# Patient Record
Sex: FEMALE | Race: WHITE | NOT HISPANIC OR LATINO | Employment: FULL TIME | URBAN - METROPOLITAN AREA
[De-identification: names, ages, dates, MRNs, and addresses within clinical notes are randomized per-mention and may not be internally consistent; named-entity substitution may affect disease eponyms.]

---

## 2020-11-30 ENCOUNTER — TELEPHONE (OUTPATIENT)
Dept: OBSTETRICS AND GYNECOLOGY | Facility: CLINIC | Age: 50
End: 2020-11-30

## 2020-11-30 NOTE — TELEPHONE ENCOUNTER
Spoke with pt appt scheduled     ----- Message from Willem Lozano sent at 11/30/2020  8:57 AM CST -----  Regarding: Appointment  Contact: MALIK MARIE [[id number]]  Name of Who is Calling: MALIK MARIE [[id number]]      What is the request in detail: would like to speak with staff in regards to an appointment for an ER f/u for cyst. Patient would like an appointment before 12/16. Please advise      Can the clinic reply by [facility]: no      What Number to Call Back [facility]: [phone]

## 2020-12-04 ENCOUNTER — OFFICE VISIT (OUTPATIENT)
Dept: OBSTETRICS AND GYNECOLOGY | Facility: CLINIC | Age: 50
End: 2020-12-04
Payer: COMMERCIAL

## 2020-12-04 ENCOUNTER — HOSPITAL ENCOUNTER (OUTPATIENT)
Dept: RADIOLOGY | Facility: HOSPITAL | Age: 50
Discharge: HOME | End: 2020-12-04
Attending: OBSTETRICS & GYNECOLOGY
Payer: COMMERCIAL

## 2020-12-04 VITALS
SYSTOLIC BLOOD PRESSURE: 110 MMHG | WEIGHT: 127.88 LBS | HEIGHT: 66 IN | BODY MASS INDEX: 20.55 KG/M2 | DIASTOLIC BLOOD PRESSURE: 60 MMHG

## 2020-12-04 DIAGNOSIS — Z01.419 ENCOUNTER FOR GYNECOLOGICAL EXAMINATION WITHOUT ABNORMAL FINDING: Primary | ICD-10-CM

## 2020-12-04 DIAGNOSIS — N83.201 BILATERAL OVARIAN CYSTS: ICD-10-CM

## 2020-12-04 DIAGNOSIS — N83.202 BILATERAL OVARIAN CYSTS: ICD-10-CM

## 2020-12-04 DIAGNOSIS — R10.2 PELVIC PAIN: ICD-10-CM

## 2020-12-04 DIAGNOSIS — Z12.39 SCREENING BREAST EXAMINATION: ICD-10-CM

## 2020-12-04 PROCEDURE — 3008F PR BODY MASS INDEX (BMI) DOCUMENTED: ICD-10-PCS | Mod: CPTII,S$GLB,, | Performed by: OBSTETRICS & GYNECOLOGY

## 2020-12-04 PROCEDURE — 1125F PR PAIN SEVERITY QUANTIFIED, PAIN PRESENT: ICD-10-PCS | Mod: S$GLB,,, | Performed by: OBSTETRICS & GYNECOLOGY

## 2020-12-04 PROCEDURE — 99999 PR PBB SHADOW E&M-EST. PATIENT-LVL III: CPT | Mod: PBBFAC,,, | Performed by: OBSTETRICS & GYNECOLOGY

## 2020-12-04 PROCEDURE — 76830 TRANSVAGINAL US NON-OB: CPT | Mod: TC

## 2020-12-04 PROCEDURE — 99999 PR PBB SHADOW E&M-EST. PATIENT-LVL III: ICD-10-PCS | Mod: PBBFAC,,, | Performed by: OBSTETRICS & GYNECOLOGY

## 2020-12-04 PROCEDURE — 76856 US EXAM PELVIC COMPLETE: CPT | Mod: 26,,, | Performed by: RADIOLOGY

## 2020-12-04 PROCEDURE — 76830 TRANSVAGINAL US NON-OB: CPT | Mod: 26,,, | Performed by: RADIOLOGY

## 2020-12-04 PROCEDURE — 99386 PR PREVENTIVE VISIT,NEW,40-64: ICD-10-PCS | Mod: S$GLB,,, | Performed by: OBSTETRICS & GYNECOLOGY

## 2020-12-04 PROCEDURE — 88175 CYTOPATH C/V AUTO FLUID REDO: CPT

## 2020-12-04 PROCEDURE — 87624 HPV HI-RISK TYP POOLED RSLT: CPT

## 2020-12-04 PROCEDURE — 1125F AMNT PAIN NOTED PAIN PRSNT: CPT | Mod: S$GLB,,, | Performed by: OBSTETRICS & GYNECOLOGY

## 2020-12-04 PROCEDURE — 76830 US PELVIS COMP WITH TRANSVAG NON-OB (XPD): ICD-10-PCS | Mod: 26,,, | Performed by: RADIOLOGY

## 2020-12-04 PROCEDURE — 99386 PREV VISIT NEW AGE 40-64: CPT | Mod: S$GLB,,, | Performed by: OBSTETRICS & GYNECOLOGY

## 2020-12-04 PROCEDURE — 76856 US PELVIS COMP WITH TRANSVAG NON-OB (XPD): ICD-10-PCS | Mod: 26,,, | Performed by: RADIOLOGY

## 2020-12-04 PROCEDURE — 3008F BODY MASS INDEX DOCD: CPT | Mod: CPTII,S$GLB,, | Performed by: OBSTETRICS & GYNECOLOGY

## 2020-12-04 RX ORDER — IBUPROFEN 800 MG/1
800 TABLET ORAL EVERY 8 HOURS PRN
Qty: 60 TABLET | Refills: 2 | Status: ON HOLD | OUTPATIENT
Start: 2020-12-04 | End: 2021-02-25 | Stop reason: HOSPADM

## 2020-12-07 DIAGNOSIS — N83.202 BILATERAL OVARIAN CYSTS: Primary | ICD-10-CM

## 2020-12-07 DIAGNOSIS — N83.201 BILATERAL OVARIAN CYSTS: Primary | ICD-10-CM

## 2020-12-08 ENCOUNTER — TELEPHONE (OUTPATIENT)
Dept: UROLOGY | Facility: CLINIC | Age: 50
End: 2020-12-08

## 2020-12-08 ENCOUNTER — TELEPHONE (OUTPATIENT)
Dept: OBSTETRICS AND GYNECOLOGY | Facility: CLINIC | Age: 50
End: 2020-12-08

## 2020-12-08 NOTE — TELEPHONE ENCOUNTER
Pt inquiring if insurance will cover blood work. Will send message to Bry Giraldo and return call to patient.    ----- Message from Lucita Arellano MA sent at 12/8/2020  1:02 PM CST -----    ----- Message -----  From: Malgorzata Rasheed  Sent: 12/7/2020  12:44 PM CST  To: Rudy Moses Staff    Type: Patient Call Back    Who called:self    What is the request in detail: patient stated she would like to speak with Johanne KELLEY. Please call    Can the clinic reply by [facility]? no    Would the patient rather a call back or a response via [facility]? call    Best call back number: [phone] or [phone]

## 2020-12-08 NOTE — TELEPHONE ENCOUNTER
----- Message from Malgorzata Rasheed sent at 12/8/2020  9:45 AM CST -----  Type: Patient Call Back    Who called: self    What is the request in detail: patient would like to speak with Johanne concerning blood work. please call    Can the clinic reply by [facility]? no    Would the patient rather a call back or a response via [facility]? call    Best call back number:.[phone] (home) [phone] (work)

## 2020-12-09 ENCOUNTER — TELEPHONE (OUTPATIENT)
Dept: GYNECOLOGIC ONCOLOGY | Facility: CLINIC | Age: 50
End: 2020-12-09

## 2020-12-09 ENCOUNTER — TELEPHONE (OUTPATIENT)
Dept: OBSTETRICS AND GYNECOLOGY | Facility: CLINIC | Age: 50
End: 2020-12-09

## 2020-12-09 NOTE — TELEPHONE ENCOUNTER
----- Message from Naseem Cyr RN sent at 12/9/2020  1:41 PM CST -----  This is a referral to the department so whoever is next.  J'me  ----- Message -----  From: Yvonne Julien  Sent: 12/9/2020   1:29 PM CST  To: Dave Sommer Staff    Who Called: MALIK MARIE    What is the reqeust in detail: Patient is following up on needing to make an appointment for bilateral ovarian cysts, states she has a referral from Dr. Aurelia Grant and she had labs done at [place]. Please advise.     Can the clinic reply by [facility]?: Yes    Best Call Back Number: [phone]

## 2020-12-09 NOTE — TELEPHONE ENCOUNTER
----- Message from Jaylan Arzate sent at 12/9/2020  9:19 AM CST -----  Regarding: self  Type: Patient Call Back    Who called: Self    What is the request in detail: please be sure the lab orders has been [facility] [address] By No[facility] today    Can the clinic reply by [facility]? no    Would the patient rather a call back or a response via [facility]? Call     Best call back number: [phone]        Patient is aware that her lab orders have been faxed

## 2020-12-09 NOTE — TELEPHONE ENCOUNTER
Spoke with our patient about her schedule appointment in gyn oncology she agreed she voiced understanding of the date, time and location. All questions answered appointment mail. MA/MIRNA /Preceptor Lucas FRAGOSO

## 2020-12-09 NOTE — TELEPHONE ENCOUNTER
----- Message from Temitope Amado sent at 12/8/2020  1:54 PM CST -----  Regarding: Self/  [phone](cell)  or [phone](work)  Type: Patient Call Back    Who called:  Patient    What is the request in detail:  Pt stated her insurance will not cover blood work at hospital.  So she would like orders sent to [email or web address].  Pt would like staff to give her a call once orders have been sent.  Thank you      Would the patient rather a call back or a response via [facility]?   Call back    Best call back number:  [phone] (home) [phone] (work)      Thank you    Patient is aware that her lab orders have been fax per her request        Patient is aware that her

## 2020-12-10 ENCOUNTER — PATIENT MESSAGE (OUTPATIENT)
Dept: OBSTETRICS AND GYNECOLOGY | Facility: CLINIC | Age: 50
End: 2020-12-10

## 2020-12-11 ENCOUNTER — PATIENT MESSAGE (OUTPATIENT)
Dept: OBSTETRICS AND GYNECOLOGY | Facility: CLINIC | Age: 50
End: 2020-12-11

## 2020-12-11 LAB
AFP-TM SERPL-MCNC: 3.2 NG/ML
B-HCG SERPL-ACNC: <3 MIU/ML
CANCER AG125 SERPL-ACNC: 19 U/ML
CANCER AG19-9 SERPL-ACNC: 7 U/ML
CEA SERPL-MCNC: 0.9 NG/ML
ESTRADIOL SERPL-MCNC: 16 PG/ML
INHIBIN B SERPL-MCNC: <10 PG/ML
LDH SERPL P TO L-CCNC: 120 U/L (ref 120–250)
LDH1 CFR SERPL ELPH: 27 % (ref 19–38)
LDH2 CFR SERPL ELPH: 32 % (ref 30–43)
LDH3 CFR SERPL ELPH: 20 % (ref 16–26)
LDH4 CFR SERPL ELPH: 10 % (ref 3–12)
LDH5 CFR SERPL ELPH: 11 % (ref 3–14)

## 2020-12-11 NOTE — PROGRESS NOTES
Subjective:      Patient ID: Yareli Melendez is a 50 y.o. female.    Chief Complaint: Ovarian Cyst Bilateral (referred by Dr. Grant)      HPI    50 yr old para 0 referred from Dr. Grant for a large adnexal mass. Presented to ED at [facility] on 11/27/2020 for flank and abdominal pain. Concern for kidney stones due to her history. CT scan showed multiple retained stones in both kidneys, non obstructing. Also findings of ovarian dermoid cyst.     CT A/P   Multiple small calyceal stones in the kidneys.   There is either a single mass or two adjacent masses in the pelvis appear to be adnexal in origin. This may represent bilateral adnexal masses which abuts in the midline. Variable attenuation with some areas of increased attenuation and at least one focus of fat attenuation. Findings likely represent bilateral dermoid cysts. Combined measures 12.3 x 9.7 x 8.2 cm. Mild free fluid in the posterior CDS.    She reported years since her last GYN exam and subsequently established with Dr. Grant.    Pelvic US 12/4/2020  Uterus 4.8 x 4.8 x 2.9 cm, no discrete masses, ES 1.8 mm  R ov with no normal ovarian tissue identified. 12.2 x 11.5 x 9 cm multi cystic lesion with thick septations and some solid nodular components within some of the septations.  L ov not well-visualized. In left adnexa a fairly heterogeneous echogenic structure 3.5 x 3.5 x 3.1 cm   No FF  Additional history provided by patient is that she underwent a CT scan at an outside facility demonstrating bilateral ovarian dermoids. Left adnexa does have sonographic features c/w fat and dermoid.  The complex cystic lesion does not demonstrate definitive sonographic characteristics of a dermoid. A lung, cystic neoplasm such as a cystadenoma or cystadenocarcinoma would be most likely.    Tumor markers all WNL   is 19  CA 19-9 is 7  Estradiol is 16  HCG < 3  CEA 0.9  AFP 3.2  Inhibin B < 10      LMP ablation.     Last pap smear 12/4/2020 pending    No  medical co morbidities. History for kidney stones.    Prior hysteroscopy/D&C/ablation and cystoscopy/lithotripsy    Family history for father - brain cancer. No breast/GYN/colon cancers.     Review of Systems   Constitutional: Negative for appetite change, chills, diaphoresis, fatigue, fever and unexpected weight change.   Respiratory: Negative for cough, chest tightness, shortness of breath and wheezing.    Cardiovascular: Negative for chest pain, palpitations and leg swelling.   Gastrointestinal: Positive for diarrhea (occas, stress-related). Negative for abdominal distention, abdominal pain, blood in stool, constipation, nausea and vomiting.   Genitourinary: Positive for pelvic pain (resolved). Negative for difficulty urinating, dyspareunia (not sexually active), dysuria, flank pain, frequency, hematuria, menstrual problem, vaginal bleeding, vaginal discharge and vaginal pain.   Musculoskeletal: Positive for back pain (resolved). Negative for arthralgias.   Skin: Negative for color change and rash.   Neurological: Negative for dizziness, weakness, numbness and headaches.   Hematological: Negative for adenopathy.   Psychiatric/Behavioral: Negative for confusion and sleep disturbance. The patient is not nervous/anxious.        Past Medical History:   Diagnosis Date    AR (allergic rhinitis)     Depression with anxiety     Ovarian cyst     bilateral     Past Surgical History:   Procedure Laterality Date    ablation uterus      DILATION AND CURETTAGE OF UTERUS      HYSTEROSCOPY      LASIK       History reviewed. No pertinent family history.  Social History     Socioeconomic History    Marital status: Single     Spouse name: Not on file    Number of children: Not on file    Years of education: Not on file    Highest education level: Not on file   Occupational History    Not on file   Social Needs    Financial resource strain: Not on file    Food insecurity     Worry: Not on file     Inability: Not on  file    Transportation needs     Medical: Not on file     Non-medical: Not on file   Tobacco Use    Smoking status: Former Smoker    Smokeless tobacco: Never Used   Substance and Sexual Activity    Alcohol use: Yes     Alcohol/week: 0.8 standard drinks     Types: 1 Standard drinks or equivalent per week    Drug use: No    Sexual activity: Not Currently   Lifestyle    Physical activity     Days per week: Not on file     Minutes per session: Not on file    Stress: Not on file   Relationships    Social connections     Talks on phone: Not on file     Gets together: Not on file     Attends [employer] service: Not on file     Active member of club or organization: Not on file     Attends meetings of clubs or organizations: Not on file     Relationship status: Not on file   Other Topics Concern    Not on file   Social History Narrative    Not on file     Current Outpatient Medications   Medication Sig    diclofenac (VOLTAREN) 75 MG EC tablet TK 1 T PO BID FOR UP TO 7 DAYS PRF PAIN    ibuprofen (ADVIL,MOTRIN) 800 MG tablet Take 1 tablet (800 mg total) by mouth every 8 (eight) hours as needed for Pain.    ondansetron (ZOFRAN-ODT) 8 MG TbDL Take 1 tablet (8 mg total) by mouth every 8 (eight) hours as needed (Nausea).    promethazine (PHENERGAN) 25 MG tablet Take 1 tablet (25 mg total) by mouth every 6 (six) hours as needed for Nausea.    HYDROmorphone (DILAUDID) 2 MG tablet Take 1 tablet (2 mg total) by mouth every 4 (four) hours as needed for Pain.    oxazepam (SERAX) 10 MG capsule 1 - 2 Capsule Oral Twice a day    tamsulosin (FLOMAX) 0.4 mg Cp24 Take 1 capsule (0.4 mg total) by mouth once daily.     No current facility-administered medications for this visit.      Review of patient's allergies indicates:   Allergen Reactions    Erythromycin base      Other reaction(s): Burn    Erythromycin      Other reaction(s): Rash    Sucralose Hives     /70   Pulse 89   Wt 57.6 kg (127 lb)   BMI 20.50 kg/m²      Objective:   Physical Exam:   Constitutional: She is oriented to person, place, and time. She appears well-developed and well-nourished. No distress.    HENT:   Head: Normocephalic and atraumatic.    Eyes: No scleral icterus.    Neck: Normal range of motion. Neck supple.    Cardiovascular: Exam reveals no cyanosis and no edema.     Pulmonary/Chest: Effort normal. No respiratory distress. She exhibits no tenderness.        Abdominal: Soft. Normal appearance. She exhibits mass. She exhibits no distension and no fluid wave. There is no abdominal tenderness. There is no rigidity, no rebound and no guarding. No hernia.     Genitourinary:    Vagina normal.      Pelvic exam was performed with patient supine.   There is no rash, tenderness or lesion on the right labia. There is no rash, tenderness or lesion on the left labia. Uterus is deviated. Uterus is not tender. Cervix is normal. Right adnexum displays mass. Right adnexum displays no tenderness and no fullness. Left adnexum displays mass. Left adnexum displays no tenderness and no fullness. No bleeding in the vagina.    Genitourinary Comments: Large smooth mobile right adnexal mass   negative for vaginal discharge          Musculoskeletal: Normal range of motion and moves all extremeties. No edema.      Lymphadenopathy:     She has no cervical adenopathy.    Neurological: She is alert and oriented to person, place, and time.    Skin: Skin is warm and dry. No rash noted. No cyanosis or erythema. No pallor.    Psychiatric: She has a normal mood and affect. Thought content normal.       Assessment:     1. Bilateral ovarian cysts    2. Pelvic mass        Plan:       Imaging and today's exam reviewed with patient and her friend  Favor a benign process with normal tumor markers, resolution of symptoms and BME today  Recommend RALH BSO resection of mass, possible staging, OIP pending intraoperative findings  The risks, benefits, and indications for surgery were discussed  with the patient. These included bleeding, infection, damage to surrounding tissues, the possibility of bowel or urologic resection and reconstruction, and the possibility of major complications including death. She voiced understanding, all questions were answered and consents were signed.

## 2020-12-14 ENCOUNTER — INITIAL CONSULT (OUTPATIENT)
Dept: GYNECOLOGIC ONCOLOGY | Facility: CLINIC | Age: 50
End: 2020-12-14
Payer: COMMERCIAL

## 2020-12-14 ENCOUNTER — PATIENT MESSAGE (OUTPATIENT)
Dept: OBSTETRICS AND GYNECOLOGY | Facility: CLINIC | Age: 50
End: 2020-12-14

## 2020-12-14 VITALS
SYSTOLIC BLOOD PRESSURE: 126 MMHG | DIASTOLIC BLOOD PRESSURE: 70 MMHG | WEIGHT: 127 LBS | BODY MASS INDEX: 20.5 KG/M2 | HEART RATE: 89 BPM

## 2020-12-14 DIAGNOSIS — N83.201 BILATERAL OVARIAN CYSTS: ICD-10-CM

## 2020-12-14 DIAGNOSIS — N83.202 BILATERAL OVARIAN CYSTS: ICD-10-CM

## 2020-12-14 DIAGNOSIS — R19.00 PELVIC MASS: ICD-10-CM

## 2020-12-14 PROCEDURE — 99245 PR OFFICE CONSULTATION,LEVEL V: ICD-10-PCS | Mod: S$GLB,,, | Performed by: OBSTETRICS & GYNECOLOGY

## 2020-12-14 PROCEDURE — 99245 OFF/OP CONSLTJ NEW/EST HI 55: CPT | Mod: S$GLB,,, | Performed by: OBSTETRICS & GYNECOLOGY

## 2020-12-14 PROCEDURE — 99999 PR PBB SHADOW E&M-EST. PATIENT-LVL III: ICD-10-PCS | Mod: PBBFAC,,, | Performed by: OBSTETRICS & GYNECOLOGY

## 2020-12-14 PROCEDURE — 99999 PR PBB SHADOW E&M-EST. PATIENT-LVL III: CPT | Mod: PBBFAC,,, | Performed by: OBSTETRICS & GYNECOLOGY

## 2020-12-14 RX ORDER — DICLOFENAC SODIUM 75 MG/1
TABLET, DELAYED RELEASE ORAL
COMMUNITY
Start: 2020-11-27

## 2020-12-14 NOTE — LETTER
December 21, 2020      Aurelia Grant MD  [address] [facility] [address]  [address]  [facility] [address]           [facility] [place] [facility] [address]  [address], [address]  [facility] [address]  Phone: [phone]  Fax: [phone]          Patient: Yareli Melendez   MR Number: [id number]   YOB: 1970   Date of Visit: 12/14/2020       Dear Dr. Aurelia Grant:    Thank you for referring Yareli Melendez to me for evaluation. Attached you will find relevant portions of my assessment and plan of care.    If you have questions, please do not hesitate to call me. I look forward to following Yareli Melendez along with you.    Sincerely,    Kemal Acosta MD    Enclosure  CC:  No Recipients    If you would like to receive this communication electronically, please contact [email or web address] or [phone] to request more information on [facility] access.    For providers and/or their staff who would like to refer a patient to [facility], please contact us through our one-stop-shop provider referral line, [facility] [place], at [phone].    If you feel you have received this communication in error or would no longer like to receive these types of communications, please e-mail [email or web address]

## 2020-12-16 LAB
HPV HR 12 DNA SPEC QL NAA+PROBE: NEGATIVE
HPV16 AG SPEC QL: NEGATIVE
HPV18 DNA SPEC QL NAA+PROBE: NEGATIVE

## 2020-12-17 NOTE — PROGRESS NOTES
Subjective:       Patient ID: Yareli Melendez is a 50 y.o. female.    Chief Complaint:  Gynecologic Exam      History of Present Illness  HPI  Annual Exam-Postmenopausal  Patient presents for annual exam.  The patient is not sexually active. GYN screening history: last pap: was normal and last mammogram: was normal. The patient is not taking hormone replacement therapy. Patient denies post-menopausal vaginal bleeding. The patient wears seatbelts: yes. The patient participates in regular exercise: yes. Has the patient ever been transfused or tattooed?: no. The patient reports that there is not domestic violence in her life.      Patient was seen at [facility] for pelvic pain.  CT scan showed two large bilateral ovarian massess consistent with dermoid cyst.  Ultrasound was not obtained.     Today, patient has been having pain.  She has continued to take NSAIDs.  She has not taken Narcotic medication.         GYN & OB History  No LMP recorded. Patient has had an ablation.   Date of Last Pap: 2020    OB History    Para Term  AB Living   0 0 0 0 0 0   SAB TAB Ectopic Multiple Live Births   0 0 0 0 0     Past Medical History:  Past Medical History:   Diagnosis Date    AR (allergic rhinitis)     Depression with anxiety     Ovarian cyst     bilateral       Past Surgical History:  Past Surgical History:   Procedure Laterality Date    ablation uterus      DILATION AND CURETTAGE OF UTERUS      HYSTEROSCOPY      LASIK         Family History:  History reviewed. No pertinent family history.    Allergies:  Review of patient's allergies indicates:   Allergen Reactions    Erythromycin base      Other reaction(s): Burn    Erythromycin      Other reaction(s): Rash    Sucralose Hives       Medications:  Current Outpatient Medications on File Prior to Visit   Medication Sig Dispense Refill    HYDROmorphone (DILAUDID) 2 MG tablet Take 1 tablet (2 mg total) by mouth every 4 (four) hours as needed for Pain. 20  tablet 0    ondansetron (ZOFRAN-ODT) 8 MG TbDL Take 1 tablet (8 mg total) by mouth every 8 (eight) hours as needed (Nausea). 20 tablet 0    oxazepam (SERAX) 10 MG capsule 1 - 2 Capsule Oral Twice a day      promethazine (PHENERGAN) 25 MG tablet Take 1 tablet (25 mg total) by mouth every 6 (six) hours as needed for Nausea. 15 tablet 0    tamsulosin (FLOMAX) 0.4 mg Cp24 Take 1 capsule (0.4 mg total) by mouth once daily. 10 capsule 0     No current facility-administered medications on file prior to visit.        Social History:  Social History     Tobacco Use    Smoking status: Former Smoker    Smokeless tobacco: Never Used   Substance Use Topics    Alcohol use: Yes     Alcohol/week: 0.8 standard drinks     Types: 1 Standard drinks or equivalent per week    Drug use: No              Review of Systems  Review of Systems   Constitutional: Positive for fatigue.   HENT: Negative.    Eyes: Negative.    Respiratory: Positive for wheezing.    Cardiovascular: Positive for palpitations.   Gastrointestinal: Positive for abdominal pain and bloating.   Endocrine: Negative.  Positive for hot flashes.   Genitourinary: Positive for pelvic pain.   Musculoskeletal: Positive for arthralgias, back pain and leg pain.   Integumentary:  Negative.   Neurological: Negative.    Hematological: Negative.    Psychiatric/Behavioral: Positive for sleep disturbance. The patient is nervous/anxious.    Breast: negative.            Objective:    Physical Exam:   Constitutional: She is oriented to person, place, and time. She appears well-nourished.    HENT:   Head: Normocephalic and atraumatic.    Eyes: EOM are normal. Right eye exhibits normal extraocular motion. Left eye exhibits normal extraocular motion.    Neck: Neck supple. No thyromegaly present.    Cardiovascular: Normal rate.     Pulmonary/Chest: Effort normal. No respiratory distress. Right breast exhibits no mass, no skin change and no tenderness. Left breast exhibits no mass, no  skin change and no tenderness. Breasts are symmetrical.        Abdominal: Soft. She exhibits no distension and no mass. There is no abdominal tenderness.     Genitourinary:    Vagina and uterus normal.      Pelvic exam was performed with patient supine.   There is no rash or lesion on the right labia. There is no rash or lesion on the left labia. Uterus is not tender. Cervix is normal. Right adnexum displays mass, tenderness and fullness. Left adnexum displays mass, tenderness and fullness. No bleeding in the vagina. Labial bartholins normal.Cervix exhibits no motion tenderness and no friability. negative for vaginal discharge          Musculoskeletal: Normal range of motion.      Lymphadenopathy:     She has no cervical adenopathy.    Neurological: She is oriented to person, place, and time.   Cranial Nerves II-XII grossly intact.    Skin: No rash noted. No erythema.    Psychiatric: She has a normal mood and affect. Her behavior is normal.        [place] [facility] [place]    1.   There is either a single mass or two adjacent masses within the pelvis as detailed above. These are of variable attenuation and contains at least one small focus of fat suggesting bilateral ovarian dermoid cyst. Further evaluation with a pelvic ultrasound would likely be of benefit.  2.   Bilateral nephrolithiasis. There is no ureterolithiasis or hydronephrosis.  3.   The exam is otherwise unremarkable.    Electronically Signed By: Sha Rhodes 11/27/2020 16:12 CST     Assessment:        1. Encounter for gynecological examination without abnormal finding    2. Screening breast examination    3. Bilateral ovarian cysts    4. Pelvic pain                Plan:      1. Encounter for gynecological examination without abnormal finding  - Pap and HPV done today.  -   Screening tests as ordered.  - Calcium and vitamin D recommended.        - Liquid-Based Pap Smear, Screening  - HPV High Risk Genotypes, PCR    2. Screening breast examination  -  Self breast exams encouraged     3. Bilateral ovarian cysts  4. Pelvic pain  - Pelvic ultrasound ordered to better assess ovarian masses.  Will call patient with results.  - Patient instructed to call office if she has not heard anything 2 wks after ultrasound.     - US Pelvis Comp with Transvag NON-OB (xpd; Future  - ibuprofen (ADVIL,MOTRIN) 800 MG tablet; Take 1 tablet (800 mg total) by mouth every 8 (eight) hours as needed for Pain.  Dispense: 60 tablet; Refill: 2

## 2020-12-21 PROBLEM — N83.202 BILATERAL OVARIAN CYSTS: Status: ACTIVE | Noted: 2020-12-21

## 2020-12-21 PROBLEM — N83.201 BILATERAL OVARIAN CYSTS: Status: ACTIVE | Noted: 2020-12-21

## 2021-01-11 ENCOUNTER — TELEPHONE (OUTPATIENT)
Dept: GYNECOLOGIC ONCOLOGY | Facility: CLINIC | Age: 51
End: 2021-01-11

## 2021-01-15 ENCOUNTER — PATIENT MESSAGE (OUTPATIENT)
Dept: OBSTETRICS AND GYNECOLOGY | Facility: CLINIC | Age: 51
End: 2021-01-15

## 2021-01-15 LAB
FINAL PATHOLOGIC DIAGNOSIS: NORMAL
Lab: NORMAL

## 2021-02-09 ENCOUNTER — TELEPHONE (OUTPATIENT)
Dept: GYNECOLOGIC ONCOLOGY | Facility: CLINIC | Age: 51
End: 2021-02-09

## 2021-02-09 DIAGNOSIS — N83.202 BILATERAL OVARIAN CYSTS: Primary | ICD-10-CM

## 2021-02-09 DIAGNOSIS — N83.201 BILATERAL OVARIAN CYSTS: Primary | ICD-10-CM

## 2021-02-18 ENCOUNTER — PATIENT MESSAGE (OUTPATIENT)
Dept: GYNECOLOGIC ONCOLOGY | Facility: CLINIC | Age: 51
End: 2021-02-18

## 2021-02-18 ENCOUNTER — TELEPHONE (OUTPATIENT)
Dept: GYNECOLOGIC ONCOLOGY | Facility: CLINIC | Age: 51
End: 2021-02-18

## 2021-02-22 ENCOUNTER — ANESTHESIA EVENT (OUTPATIENT)
Dept: SURGERY | Facility: OTHER | Age: 51
End: 2021-02-22
Payer: COMMERCIAL

## 2021-02-22 ENCOUNTER — HOSPITAL ENCOUNTER (OUTPATIENT)
Dept: PREADMISSION TESTING | Facility: OTHER | Age: 51
Discharge: HOME | End: 2021-02-22
Attending: OBSTETRICS & GYNECOLOGY
Payer: COMMERCIAL

## 2021-02-22 VITALS
TEMPERATURE: 98 F | DIASTOLIC BLOOD PRESSURE: 65 MMHG | WEIGHT: 128 LBS | HEIGHT: 66 IN | OXYGEN SATURATION: 100 % | HEART RATE: 84 BPM | BODY MASS INDEX: 20.57 KG/M2 | SYSTOLIC BLOOD PRESSURE: 122 MMHG

## 2021-02-22 DIAGNOSIS — Z01.812 PRE-PROCEDURE LAB EXAM: ICD-10-CM

## 2021-02-22 DIAGNOSIS — N83.202 BILATERAL OVARIAN CYSTS: ICD-10-CM

## 2021-02-22 DIAGNOSIS — N83.201 BILATERAL OVARIAN CYSTS: ICD-10-CM

## 2021-02-22 LAB
ABO + RH BLD: NORMAL
BLD GP AB SCN CELLS X3 SERPL QL: NORMAL
RH BLD: NORMAL
SARS-COV-2 RNA RESP QL NAA+PROBE: NOT DETECTED

## 2021-02-22 PROCEDURE — 86901 BLOOD TYPING SEROLOGIC RH(D): CPT

## 2021-02-22 PROCEDURE — U0005 INFEC AGEN DETEC AMPLI PROBE: HCPCS

## 2021-02-22 PROCEDURE — 36415 COLL VENOUS BLD VENIPUNCTURE: CPT

## 2021-02-22 PROCEDURE — 86900 BLOOD TYPING SEROLOGIC ABO: CPT

## 2021-02-22 PROCEDURE — U0003 INFECTIOUS AGENT DETECTION BY NUCLEIC ACID (DNA OR RNA); SEVERE ACUTE RESPIRATORY SYNDROME CORONAVIRUS 2 (SARS-COV-2) (CORONAVIRUS DISEASE [COVID-19]), AMPLIFIED PROBE TECHNIQUE, MAKING USE OF HIGH THROUGHPUT TECHNOLOGIES AS DESCRIBED BY CMS-2020-01-R: HCPCS

## 2021-02-22 RX ORDER — ACETAMINOPHEN 500 MG
1000 TABLET ORAL
Status: CANCELLED | OUTPATIENT
Start: 2021-02-22 | End: 2021-02-22

## 2021-02-22 RX ORDER — SODIUM CHLORIDE, SODIUM LACTATE, POTASSIUM CHLORIDE, CALCIUM CHLORIDE 600; 310; 30; 20 MG/100ML; MG/100ML; MG/100ML; MG/100ML
INJECTION, SOLUTION INTRAVENOUS CONTINUOUS
Status: CANCELLED | OUTPATIENT
Start: 2021-02-22

## 2021-02-22 RX ORDER — SCOLOPAMINE TRANSDERMAL SYSTEM 1 MG/1
1 PATCH, EXTENDED RELEASE TRANSDERMAL ONCE
Status: CANCELLED | OUTPATIENT
Start: 2021-02-22 | End: 2021-02-22

## 2021-02-22 RX ORDER — CELECOXIB 200 MG/1
400 CAPSULE ORAL
Status: CANCELLED | OUTPATIENT
Start: 2021-02-22 | End: 2021-02-22

## 2021-02-22 RX ORDER — LIDOCAINE HYDROCHLORIDE 10 MG/ML
0.5 INJECTION, SOLUTION EPIDURAL; INFILTRATION; INTRACAUDAL; PERINEURAL ONCE
Status: CANCELLED | OUTPATIENT
Start: 2021-02-22 | End: 2021-02-22

## 2021-02-22 RX ORDER — PREGABALIN 50 MG/1
50 CAPSULE ORAL
Status: CANCELLED | OUTPATIENT
Start: 2021-02-22 | End: 2021-02-22

## 2021-02-24 ENCOUNTER — TELEPHONE (OUTPATIENT)
Dept: GYNECOLOGIC ONCOLOGY | Facility: CLINIC | Age: 51
End: 2021-02-24

## 2021-02-25 ENCOUNTER — ANESTHESIA (OUTPATIENT)
Dept: SURGERY | Facility: OTHER | Age: 51
End: 2021-02-25
Payer: COMMERCIAL

## 2021-02-25 ENCOUNTER — HOSPITAL ENCOUNTER (OUTPATIENT)
Facility: OTHER | Age: 51
Discharge: HOME | End: 2021-02-25
Attending: OBSTETRICS & GYNECOLOGY | Admitting: OBSTETRICS & GYNECOLOGY
Payer: COMMERCIAL

## 2021-02-25 VITALS
BODY MASS INDEX: 20.57 KG/M2 | SYSTOLIC BLOOD PRESSURE: 137 MMHG | WEIGHT: 128 LBS | RESPIRATION RATE: 18 BRPM | HEIGHT: 66 IN | HEART RATE: 108 BPM | TEMPERATURE: 99 F | DIASTOLIC BLOOD PRESSURE: 69 MMHG | OXYGEN SATURATION: 97 %

## 2021-02-25 DIAGNOSIS — R19.00 PELVIC MASS IN FEMALE: ICD-10-CM

## 2021-02-25 DIAGNOSIS — N83.201 BILATERAL OVARIAN CYSTS: ICD-10-CM

## 2021-02-25 DIAGNOSIS — N83.202 BILATERAL OVARIAN CYSTS: ICD-10-CM

## 2021-02-25 DIAGNOSIS — Z90.710 S/P HYSTERECTOMY: Primary | ICD-10-CM

## 2021-02-25 PROCEDURE — 58571 PR LAPAROSCOPY W TOT HYSTERECTUTERUS <=250 GRAM  W TUBE/OVARY: ICD-10-PCS | Mod: AS,,, | Performed by: STUDENT IN AN ORGANIZED HEALTH CARE EDUCATION/TRAINING PROGRAM

## 2021-02-25 PROCEDURE — 25000003 PHARM REV CODE 250: Performed by: ANESTHESIOLOGY

## 2021-02-25 PROCEDURE — 88307 TISSUE EXAM BY PATHOLOGIST: CPT | Performed by: PATHOLOGY

## 2021-02-25 PROCEDURE — 71000033 HC RECOVERY, INTIAL HOUR: Performed by: OBSTETRICS & GYNECOLOGY

## 2021-02-25 PROCEDURE — 71000039 HC RECOVERY, EACH ADD'L HOUR: Performed by: OBSTETRICS & GYNECOLOGY

## 2021-02-25 PROCEDURE — 27201423 OPTIME MED/SURG SUP & DEVICES STERILE SUPPLY: Performed by: OBSTETRICS & GYNECOLOGY

## 2021-02-25 PROCEDURE — 82962 GLUCOSE BLOOD TEST: CPT | Performed by: OBSTETRICS & GYNECOLOGY

## 2021-02-25 PROCEDURE — 58571 TLH W/T/O 250 G OR LESS: CPT | Mod: AS,,, | Performed by: STUDENT IN AN ORGANIZED HEALTH CARE EDUCATION/TRAINING PROGRAM

## 2021-02-25 PROCEDURE — 63600175 PHARM REV CODE 636 W HCPCS: Performed by: OBSTETRICS & GYNECOLOGY

## 2021-02-25 PROCEDURE — 58571 PR LAPAROSCOPY W TOT HYSTERECTUTERUS <=250 GRAM  W TUBE/OVARY: ICD-10-PCS | Mod: ,,, | Performed by: OBSTETRICS & GYNECOLOGY

## 2021-02-25 PROCEDURE — 63600175 PHARM REV CODE 636 W HCPCS: Performed by: NURSE ANESTHETIST, CERTIFIED REGISTERED

## 2021-02-25 PROCEDURE — 25000003 PHARM REV CODE 250: Performed by: OBSTETRICS & GYNECOLOGY

## 2021-02-25 PROCEDURE — 88307 PR  SURG PATH,LEVEL V: ICD-10-PCS | Mod: 26,,, | Performed by: PATHOLOGY

## 2021-02-25 PROCEDURE — 63600175 PHARM REV CODE 636 W HCPCS: Performed by: ANESTHESIOLOGY

## 2021-02-25 PROCEDURE — 71000015 HC POSTOP RECOV 1ST HR: Performed by: OBSTETRICS & GYNECOLOGY

## 2021-02-25 PROCEDURE — 36000712 HC OR TIME LEV V 1ST 15 MIN: Performed by: OBSTETRICS & GYNECOLOGY

## 2021-02-25 PROCEDURE — 37000008 HC ANESTHESIA 1ST 15 MINUTES: Performed by: OBSTETRICS & GYNECOLOGY

## 2021-02-25 PROCEDURE — 37000009 HC ANESTHESIA EA ADD 15 MINS: Performed by: OBSTETRICS & GYNECOLOGY

## 2021-02-25 PROCEDURE — 88307 TISSUE EXAM BY PATHOLOGIST: CPT | Mod: 26,,, | Performed by: PATHOLOGY

## 2021-02-25 PROCEDURE — 58571 TLH W/T/O 250 G OR LESS: CPT | Mod: ,,, | Performed by: OBSTETRICS & GYNECOLOGY

## 2021-02-25 PROCEDURE — 25000003 PHARM REV CODE 250: Performed by: NURSE ANESTHETIST, CERTIFIED REGISTERED

## 2021-02-25 PROCEDURE — 71000016 HC POSTOP RECOV ADDL HR: Performed by: OBSTETRICS & GYNECOLOGY

## 2021-02-25 PROCEDURE — 36000713 HC OR TIME LEV V EA ADD 15 MIN: Performed by: OBSTETRICS & GYNECOLOGY

## 2021-02-25 RX ORDER — SODIUM CHLORIDE, SODIUM LACTATE, POTASSIUM CHLORIDE, CALCIUM CHLORIDE 600; 310; 30; 20 MG/100ML; MG/100ML; MG/100ML; MG/100ML
INJECTION, SOLUTION INTRAVENOUS CONTINUOUS
Status: DISCONTINUED | OUTPATIENT
Start: 2021-02-25 | End: 2021-02-25 | Stop reason: HOSPADM

## 2021-02-25 RX ORDER — MIDAZOLAM HYDROCHLORIDE 1 MG/ML
INJECTION INTRAMUSCULAR; INTRAVENOUS
Status: DISCONTINUED | OUTPATIENT
Start: 2021-02-25 | End: 2021-02-25

## 2021-02-25 RX ORDER — LIDOCAINE HYDROCHLORIDE 10 MG/ML
1 INJECTION, SOLUTION EPIDURAL; INFILTRATION; INTRACAUDAL; PERINEURAL ONCE
Status: ACTIVE | OUTPATIENT
Start: 2021-02-25

## 2021-02-25 RX ORDER — ACETAMINOPHEN 500 MG
1000 TABLET ORAL
Status: COMPLETED | OUTPATIENT
Start: 2021-02-25 | End: 2021-02-25

## 2021-02-25 RX ORDER — ONDANSETRON 2 MG/ML
INJECTION INTRAMUSCULAR; INTRAVENOUS
Status: DISCONTINUED | OUTPATIENT
Start: 2021-02-25 | End: 2021-02-25

## 2021-02-25 RX ORDER — DEXAMETHASONE SODIUM PHOSPHATE 4 MG/ML
INJECTION, SOLUTION INTRA-ARTICULAR; INTRALESIONAL; INTRAMUSCULAR; INTRAVENOUS; SOFT TISSUE
Status: DISCONTINUED | OUTPATIENT
Start: 2021-02-25 | End: 2021-02-25

## 2021-02-25 RX ORDER — FENTANYL CITRATE 50 UG/ML
INJECTION, SOLUTION INTRAMUSCULAR; INTRAVENOUS
Status: DISCONTINUED | OUTPATIENT
Start: 2021-02-25 | End: 2021-02-25

## 2021-02-25 RX ORDER — IBUPROFEN 800 MG/1
800 TABLET ORAL 3 TIMES DAILY
Qty: 60 TABLET | Refills: 0 | Status: SHIPPED | OUTPATIENT
Start: 2021-02-25

## 2021-02-25 RX ORDER — PROPOFOL 10 MG/ML
VIAL (ML) INTRAVENOUS CONTINUOUS PRN
Status: DISCONTINUED | OUTPATIENT
Start: 2021-02-25 | End: 2021-02-25

## 2021-02-25 RX ORDER — MUPIROCIN 20 MG/G
OINTMENT TOPICAL
Status: DISPENSED | OUTPATIENT
Start: 2021-02-25

## 2021-02-25 RX ORDER — KETAMINE HCL IN 0.9 % NACL 50 MG/5 ML
SYRINGE (ML) INTRAVENOUS
Status: DISCONTINUED | OUTPATIENT
Start: 2021-02-25 | End: 2021-02-25

## 2021-02-25 RX ORDER — HYDROMORPHONE HYDROCHLORIDE 2 MG/ML
0.4 INJECTION, SOLUTION INTRAMUSCULAR; INTRAVENOUS; SUBCUTANEOUS EVERY 5 MIN PRN
Status: DISCONTINUED | OUTPATIENT
Start: 2021-02-25 | End: 2021-02-25 | Stop reason: HOSPADM

## 2021-02-25 RX ORDER — LIDOCAINE HCL/PF 100 MG/5ML
SYRINGE (ML) INTRAVENOUS
Status: DISCONTINUED | OUTPATIENT
Start: 2021-02-25 | End: 2021-02-25

## 2021-02-25 RX ORDER — CEFAZOLIN SODIUM 1 G/3ML
2 INJECTION, POWDER, FOR SOLUTION INTRAMUSCULAR; INTRAVENOUS
Status: COMPLETED | OUTPATIENT
Start: 2021-02-25 | End: 2021-02-25

## 2021-02-25 RX ORDER — PREGABALIN 50 MG/1
50 CAPSULE ORAL
Status: COMPLETED | OUTPATIENT
Start: 2021-02-25 | End: 2021-02-25

## 2021-02-25 RX ORDER — LIDOCAINE HYDROCHLORIDE 10 MG/ML
0.5 INJECTION, SOLUTION EPIDURAL; INFILTRATION; INTRACAUDAL; PERINEURAL ONCE
Status: DISCONTINUED | OUTPATIENT
Start: 2021-02-25 | End: 2021-02-25 | Stop reason: HOSPADM

## 2021-02-25 RX ORDER — DIPHENHYDRAMINE HYDROCHLORIDE 50 MG/ML
INJECTION INTRAMUSCULAR; INTRAVENOUS
Status: DISCONTINUED | OUTPATIENT
Start: 2021-02-25 | End: 2021-02-25

## 2021-02-25 RX ORDER — MEPERIDINE HYDROCHLORIDE 25 MG/ML
12.5 INJECTION INTRAMUSCULAR; INTRAVENOUS; SUBCUTANEOUS ONCE AS NEEDED
Status: COMPLETED | OUTPATIENT
Start: 2021-02-25 | End: 2021-02-25

## 2021-02-25 RX ORDER — SODIUM CHLORIDE 0.9 % (FLUSH) 0.9 %
3 SYRINGE (ML) INJECTION
Status: DISCONTINUED | OUTPATIENT
Start: 2021-02-25 | End: 2021-02-25 | Stop reason: HOSPADM

## 2021-02-25 RX ORDER — ROCURONIUM BROMIDE 10 MG/ML
INJECTION, SOLUTION INTRAVENOUS
Status: DISCONTINUED | OUTPATIENT
Start: 2021-02-25 | End: 2021-02-25

## 2021-02-25 RX ORDER — CELECOXIB 200 MG/1
400 CAPSULE ORAL
Status: COMPLETED | OUTPATIENT
Start: 2021-02-25 | End: 2021-02-25

## 2021-02-25 RX ORDER — HYDROCODONE BITARTRATE AND ACETAMINOPHEN 5; 325 MG/1; MG/1
1 TABLET ORAL EVERY 6 HOURS PRN
Qty: 20 TABLET | Refills: 0 | Status: SHIPPED | OUTPATIENT
Start: 2021-02-25

## 2021-02-25 RX ORDER — OXYCODONE HYDROCHLORIDE 5 MG/1
5 TABLET ORAL
Status: DISCONTINUED | OUTPATIENT
Start: 2021-02-25 | End: 2021-02-25 | Stop reason: HOSPADM

## 2021-02-25 RX ORDER — SCOLOPAMINE TRANSDERMAL SYSTEM 1 MG/1
1 PATCH, EXTENDED RELEASE TRANSDERMAL ONCE
Status: COMPLETED | OUTPATIENT
Start: 2021-02-25 | End: 2021-02-25

## 2021-02-25 RX ORDER — PROPOFOL 10 MG/ML
VIAL (ML) INTRAVENOUS
Status: DISCONTINUED | OUTPATIENT
Start: 2021-02-25 | End: 2021-02-25

## 2021-02-25 RX ORDER — ONDANSETRON 2 MG/ML
4 INJECTION INTRAMUSCULAR; INTRAVENOUS DAILY PRN
Status: DISCONTINUED | OUTPATIENT
Start: 2021-02-25 | End: 2021-02-25 | Stop reason: HOSPADM

## 2021-02-25 RX ADMIN — CELECOXIB 400 MG: 200 CAPSULE ORAL at 11:02

## 2021-02-25 RX ADMIN — CARBOXYMETHYLCELLULOSE SODIUM 2 DROP: 2.5 SOLUTION/ DROPS OPHTHALMIC at 12:02

## 2021-02-25 RX ADMIN — DEXAMETHASONE SODIUM PHOSPHATE 8 MG: 4 INJECTION, SOLUTION INTRAMUSCULAR; INTRAVENOUS at 12:02

## 2021-02-25 RX ADMIN — SCOPALAMINE 1 PATCH: 1 PATCH, EXTENDED RELEASE TRANSDERMAL at 12:02

## 2021-02-25 RX ADMIN — SODIUM CHLORIDE, SODIUM LACTATE, POTASSIUM CHLORIDE, AND CALCIUM CHLORIDE: 600; 310; 30; 20 INJECTION, SOLUTION INTRAVENOUS at 02:02

## 2021-02-25 RX ADMIN — ACETAMINOPHEN 1000 MG: 500 TABLET ORAL at 11:02

## 2021-02-25 RX ADMIN — MIDAZOLAM HYDROCHLORIDE 2 MG: 1 INJECTION, SOLUTION INTRAMUSCULAR; INTRAVENOUS at 12:02

## 2021-02-25 RX ADMIN — SODIUM CHLORIDE, SODIUM LACTATE, POTASSIUM CHLORIDE, AND CALCIUM CHLORIDE: 600; 310; 30; 20 INJECTION, SOLUTION INTRAVENOUS at 12:02

## 2021-02-25 RX ADMIN — OXYCODONE 5 MG: 5 TABLET ORAL at 04:02

## 2021-02-25 RX ADMIN — FENTANYL CITRATE 50 MCG: 50 INJECTION, SOLUTION INTRAMUSCULAR; INTRAVENOUS at 02:02

## 2021-02-25 RX ADMIN — Medication 20 MG: at 12:02

## 2021-02-25 RX ADMIN — ONDANSETRON HYDROCHLORIDE 4 MG: 2 INJECTION INTRAMUSCULAR; INTRAVENOUS at 01:02

## 2021-02-25 RX ADMIN — ESMOLOL HYDROCHLORIDE 20 MG: 10 INJECTION INTRAVENOUS at 12:02

## 2021-02-25 RX ADMIN — SUGAMMADEX 200 MG: 100 INJECTION, SOLUTION INTRAVENOUS at 02:02

## 2021-02-25 RX ADMIN — FENTANYL CITRATE 100 MCG: 50 INJECTION, SOLUTION INTRAMUSCULAR; INTRAVENOUS at 12:02

## 2021-02-25 RX ADMIN — PREGABALIN 50 MG: 50 CAPSULE ORAL at 11:02

## 2021-02-25 RX ADMIN — LIDOCAINE HYDROCHLORIDE 100 MG: 20 INJECTION, SOLUTION INTRAVENOUS at 12:02

## 2021-02-25 RX ADMIN — PROPOFOL 30 MG: 10 INJECTION, EMULSION INTRAVENOUS at 12:02

## 2021-02-25 RX ADMIN — MEPERIDINE HYDROCHLORIDE 12.5 MG: 25 INJECTION INTRAMUSCULAR; INTRAVENOUS; SUBCUTANEOUS at 02:02

## 2021-02-25 RX ADMIN — MUPIROCIN: 20 OINTMENT TOPICAL at 11:02

## 2021-02-25 RX ADMIN — ROCURONIUM BROMIDE 40 MG: 10 SOLUTION INTRAVENOUS at 12:02

## 2021-02-25 RX ADMIN — PROPOFOL 30 MG: 10 INJECTION, EMULSION INTRAVENOUS at 02:02

## 2021-02-25 RX ADMIN — ROCURONIUM BROMIDE 10 MG: 10 SOLUTION INTRAVENOUS at 01:02

## 2021-02-25 RX ADMIN — PROPOFOL 150 MCG/KG/MIN: 10 INJECTION, EMULSION INTRAVENOUS at 12:02

## 2021-02-25 RX ADMIN — FENTANYL CITRATE 50 MCG: 50 INJECTION, SOLUTION INTRAMUSCULAR; INTRAVENOUS at 12:02

## 2021-02-25 RX ADMIN — PROPOFOL 150 MG: 10 INJECTION, EMULSION INTRAVENOUS at 12:02

## 2021-02-25 RX ADMIN — ROCURONIUM BROMIDE 10 MG: 10 SOLUTION INTRAVENOUS at 12:02

## 2021-02-25 RX ADMIN — CEFAZOLIN 2 G: 330 INJECTION, POWDER, FOR SOLUTION INTRAMUSCULAR; INTRAVENOUS at 12:02

## 2021-02-25 RX ADMIN — DIPHENHYDRAMINE HYDROCHLORIDE 12.5 MG: 50 INJECTION, SOLUTION INTRAMUSCULAR; INTRAVENOUS at 12:02

## 2021-02-26 LAB — POCT GLUCOSE: 71 MG/DL (ref 70–110)

## 2021-03-03 ENCOUNTER — TELEPHONE (OUTPATIENT)
Dept: GYNECOLOGIC ONCOLOGY | Facility: CLINIC | Age: 51
End: 2021-03-03

## 2021-03-03 DIAGNOSIS — R30.0 DYSURIA: Primary | ICD-10-CM

## 2021-03-03 RX ORDER — NITROFURANTOIN 25; 75 MG/1; MG/1
100 CAPSULE ORAL 2 TIMES DAILY
Qty: 14 CAPSULE | Refills: 0 | Status: SHIPPED | OUTPATIENT
Start: 2021-03-03 | End: 2021-03-10

## 2021-03-08 LAB
FINAL PATHOLOGIC DIAGNOSIS: NORMAL
Lab: NORMAL

## 2021-03-09 ENCOUNTER — TELEPHONE (OUTPATIENT)
Dept: GYNECOLOGIC ONCOLOGY | Facility: CLINIC | Age: 51
End: 2021-03-09

## 2021-03-26 ENCOUNTER — OFFICE VISIT (OUTPATIENT)
Dept: GYNECOLOGIC ONCOLOGY | Facility: CLINIC | Age: 51
End: 2021-03-26
Payer: COMMERCIAL

## 2021-03-26 VITALS
DIASTOLIC BLOOD PRESSURE: 63 MMHG | SYSTOLIC BLOOD PRESSURE: 144 MMHG | BODY MASS INDEX: 20.46 KG/M2 | HEART RATE: 80 BPM | WEIGHT: 126.75 LBS

## 2021-03-26 DIAGNOSIS — N83.202 BILATERAL OVARIAN CYSTS: Primary | ICD-10-CM

## 2021-03-26 DIAGNOSIS — N83.201 BILATERAL OVARIAN CYSTS: Primary | ICD-10-CM

## 2021-03-26 PROCEDURE — 3008F PR BODY MASS INDEX (BMI) DOCUMENTED: ICD-10-PCS | Mod: CPTII,S$GLB,, | Performed by: OBSTETRICS & GYNECOLOGY

## 2021-03-26 PROCEDURE — 99024 POSTOP FOLLOW-UP VISIT: CPT | Mod: S$GLB,,, | Performed by: OBSTETRICS & GYNECOLOGY

## 2021-03-26 PROCEDURE — 3008F BODY MASS INDEX DOCD: CPT | Mod: CPTII,S$GLB,, | Performed by: OBSTETRICS & GYNECOLOGY

## 2021-03-26 PROCEDURE — 99999 PR PBB SHADOW E&M-EST. PATIENT-LVL III: CPT | Mod: PBBFAC,,, | Performed by: OBSTETRICS & GYNECOLOGY

## 2021-03-26 PROCEDURE — 1126F AMNT PAIN NOTED NONE PRSNT: CPT | Mod: S$GLB,,, | Performed by: OBSTETRICS & GYNECOLOGY

## 2021-03-26 PROCEDURE — 99024 PR POST-OP FOLLOW-UP VISIT: ICD-10-PCS | Mod: S$GLB,,, | Performed by: OBSTETRICS & GYNECOLOGY

## 2021-03-26 PROCEDURE — 1126F PR PAIN SEVERITY QUANTIFIED, NO PAIN PRESENT: ICD-10-PCS | Mod: S$GLB,,, | Performed by: OBSTETRICS & GYNECOLOGY

## 2021-03-26 PROCEDURE — 99999 PR PBB SHADOW E&M-EST. PATIENT-LVL III: ICD-10-PCS | Mod: PBBFAC,,, | Performed by: OBSTETRICS & GYNECOLOGY

## 2021-04-15 ENCOUNTER — PATIENT MESSAGE (OUTPATIENT)
Dept: RESEARCH | Facility: HOSPITAL | Age: 51
End: 2021-04-15

## 2023-08-16 ENCOUNTER — OFFICE VISIT (OUTPATIENT)
Dept: URGENT CARE | Facility: CLINIC | Age: 53
End: 2023-08-16
Payer: COMMERCIAL

## 2023-08-16 DIAGNOSIS — S29.019A THORACIC MYOFASCIAL STRAIN, INITIAL ENCOUNTER: ICD-10-CM
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
## 2023-08-16 DIAGNOSIS — S80.01XA CONTUSION OF RIGHT KNEE, INITIAL ENCOUNTER: ICD-10-CM

## 2023-08-16 DIAGNOSIS — S80.02XA CONTUSION OF LEFT KNEE, INITIAL ENCOUNTER: Primary | ICD-10-CM

## 2023-08-16 DIAGNOSIS — S60.212A CONTUSION OF LEFT WRIST, INITIAL ENCOUNTER: ICD-10-CM

## 2023-08-16 DIAGNOSIS — S60.211A CONTUSION OF RIGHT WRIST, INITIAL ENCOUNTER: ICD-10-CM

## 2023-08-16 PROCEDURE — 99203 PR OFFICE/OUTPT VISIT, NEW, LEVL III, 30-44 MIN: ICD-10-PCS | Mod: S$GLB,,, | Performed by: EMERGENCY MEDICINE

## 2023-08-16 PROCEDURE — 99203 OFFICE O/P NEW LOW 30 MIN: CPT | Mod: S$GLB,,, | Performed by: EMERGENCY MEDICINE

## 2023-08-16 RX ORDER — NAPROXEN 500 MG/1
500 TABLET ORAL 2 TIMES DAILY WITH MEALS
Qty: 20 TABLET | Refills: 0 | Status: SHIPPED | OUTPATIENT
Start: 2023-08-16 | End: 2023-08-26

## 2023-08-16 NOTE — PROGRESS NOTES
Subjective:      Patient ID: Yareli Melendez is a 52 y.o. female.    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  She reports landing on her hands wrists elbows knees without any area hurting worse than the other.  She reports mild soreness however full range of motion of bilateral knees wrists hand fingers.  She denies any shoulder pain any neck pain.  On the right side of the paraspinous musculature of the thoracic region she has some mild muscle stiffness with twisting.  No palpable pain or tenderness to palpation.  No midline pain of the cervical thoracic or lumbar spine.  She just wanted to make sure that everything was documented and okay.  I do recommend rest, icing sore areas, anti-inflammatory naproxen and returning Friday morning for anticipated clearance to work regular duty without restrictions.  Will keep her off and resting for the next 36 hours and return to clinic Friday morning.  Naproxen prescription given.    Arm Injury   The incident occurred 6 to 12 hours ago. The incident occurred at work. The injury mechanism was a fall. The pain is present in the left elbow, left forearm, left hand, right hand, right wrist and left wrist. The quality of the pain is described as aching and stabbing. The pain radiates to the left neck and back. The pain is at a severity of 4/10. The pain is moderate. The pain has been Intermittent since the incident. Associated symptoms include tingling. Pertinent negatives  include no chest pain, muscle weakness or numbness. The symptoms are aggravated by movement.       ros    Constitution: Negative for chills, fatigue and fever.   HENT:  Negative for ear pain, sinus pain and sore throat.    Neck: Negative for neck pain and neck stiffness.   Cardiovascular:  Negative for chest pain, palpitations and sob on exertion.   Eyes:  Negative for eye pain and vision loss.   Respiratory:  Negative for cough, shortness of breath and asthma.    Gastrointestinal:  Negative for abdominal pain, nausea, vomiting and diarrhea.   Genitourinary:  Negative for dysuria, frequency and hematuria.   Musculoskeletal:  Positive for pain, trauma and muscle ache. Negative for abnormal ROM of joint and back pain.   Skin:  Negative for rash, wound and erythema.   Allergic/Immunologic: Negative for seasonal allergies and asthma.   Neurological:  Negative for dizziness, light-headedness, altered mental status and numbness.   Psychiatric/Behavioral:  Negative for altered mental status and confusion.      Objective:     Physical Exam  Vitals and nursing note reviewed.   Constitutional:       General: She is not in acute distress.     Appearance: Normal appearance. She is well-developed. She is not diaphoretic.   HENT:      Head: Normocephalic and atraumatic.      Nose: Nose normal.   Eyes:      General: Lids are normal.      Conjunctiva/sclera: Conjunctivae normal.   Neck:      Trachea: Trachea and phonation normal.   Cardiovascular:      Rate and Rhythm: Normal rate and regular rhythm.      Pulses: Normal pulses.      Heart sounds: Normal heart sounds.   Pulmonary:      Effort: Pulmonary effort is normal.      Breath sounds: Normal breath sounds.   Abdominal:      General: Bowel sounds are normal. There is no abdominal bruit.      Palpations: Abdomen is soft. There is no mass or pulsatile mass.   Musculoskeletal:         General: Signs of injury present. No swelling or deformity.      Cervical back: Full passive  range of motion without pain, normal range of motion and neck supple.      Comments: Examination of bilateral lower extremity shows normal range of motion of bilateral knees without any ecchymosis or swelling.  She reports to the patellar region where the area of impact was however no signs of trauma and full range of motion and full strength on bilateral knee extension.  Anterior-posterior drawer signs negative and no pain on varus or valgus stress.      Examination bilateral hands and wrist shows no swelling no ecchymosis and normal range of motion of the fingers wrist and elbow.  Bilateral shoulder exam normal.  Examination of the spine shows no cervical, thoracic, lumbar tenderness to palpation and no midline pain certainly.  There is mild right-sided thoracic paraspinous muscle stiffness not described as pain when she twists and flexes past 90°.   Skin:     General: Skin is warm and dry.      Findings: No bruising or erythema.   Neurological:      Mental Status: She is alert and oriented to person, place, and time.      Sensory: No sensory deficit.      Deep Tendon Reflexes: Reflexes are normal and symmetric.   Psychiatric:         Speech: Speech normal.         Behavior: Behavior normal. Behavior is cooperative.         Thought Content: Thought content normal.         Judgment: Judgment normal.         Assessment:      1. Contusion of left knee, initial encounter    2. Contusion of right knee, initial encounter    3. Fall, initial encounter    4. Thoracic myofascial strain, initial encounter    5. Contusion of right wrist, initial encounter    6. 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  She reports landing on her hands wrists elbows knees without any area hurting worse than the other.  She reports mild soreness however full range of motion of bilateral knees wrists  hand fingers.  She denies any shoulder pain any neck pain.  On the right side of the paraspinous musculature of the thoracic region she has some mild muscle stiffness with twisting.  No palpable pain or tenderness to palpation.  No midline pain of the cervical thoracic or lumbar spine.  She just wanted to make sure that everything was documented and okay.  I do recommend rest, icing sore areas, anti-inflammatory naproxen and returning Friday morning for anticipated clearance to work regular duty without restrictions.  Will keep her off and resting for the next 36 hours and return to clinic Friday morning.  Naproxen prescription given.    Medications Ordered This Encounter   Medications    naproxen (NAPROSYN) 500 MG tablet     Sig: Take 1 tablet (500 mg total) by mouth 2 (two) times daily with meals. for 10 days     Dispense:  20 tablet     Refill:  0     Patient Instructions: Attention not to aggravate affected area, Apply ice 24-48 hours then apply heat/warm soaks, Elevated affected area (rest, ice, elevate, naproxen rx for 36 hours)   Restrictions: Home today, Disabled until next office visit (return to clinic friday morning for anticipated clearance to return to work without restrictions.)  Follow up in about 2 days (around 8/18/2023).

## 2023-08-16 NOTE — LETTER
[facility] - Urgent Care  [address] [facility], [address]  [place] [address]  Phone: [phone]  Fax: [phone]  [facility] [facility]: [id number]    Pt Name: Yareli Melendez  Injury Date: 08/16/2023   Employee ID: [id number] Date of First Treatment: 08/16/2023   Company: [employer] employee      Appointment Time: 01:27 PM Arrived: 01:27 PM   Provider: Mike Crawford MD Time Out:02:05 PM     Office Treatment:   1. Contusion of left knee, initial encounter    2. Contusion of right knee, initial encounter    3. Fall, initial encounter    4. Thoracic myofascial strain, initial encounter    5. Contusion of right wrist, initial encounter    6. Contusion of left wrist, initial encounter      Medications Ordered This Encounter   Medications    naproxen (NAPROSYN) 500 MG tablet      Patient Instructions: Attention not to aggravate affected area, Apply ice 24-48 hours then apply heat/warm soaks, Elevated affected area (rest, ice, elevate, naproxen rx for 36 hours)    Restrictions: Home today, Disabled until next office visit (return to clinic friday morning for anticipated clearance to return to work without restrictions.)     Return Appointment: 8/18/2023 at 09:00 AM  MASON

## 2023-08-18 ENCOUNTER — OFFICE VISIT (OUTPATIENT)
Dept: URGENT CARE | Facility: CLINIC | Age: 53
End: 2023-08-18
Payer: COMMERCIAL
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
## 2023-08-18 DIAGNOSIS — S60.211D CONTUSION OF RIGHT WRIST, SUBSEQUENT ENCOUNTER: ICD-10-CM

## 2023-08-18 DIAGNOSIS — S60.212D CONTUSION OF LEFT WRIST, SUBSEQUENT ENCOUNTER: ICD-10-CM

## 2023-08-18 DIAGNOSIS — S80.02XD CONTUSION OF LEFT KNEE, SUBSEQUENT ENCOUNTER: ICD-10-CM

## 2023-08-18 DIAGNOSIS — S29.019D THORACIC MYOFASCIAL STRAIN, SUBSEQUENT ENCOUNTER: ICD-10-CM

## 2023-08-18 DIAGNOSIS — Z02.6 ENCOUNTER RELATED TO WORKER'S COMPENSATION CLAIM: Primary | ICD-10-CM

## 2023-08-18 PROCEDURE — 99214 PR OFFICE/OUTPT VISIT, EST, LEVL IV, 30-39 MIN: ICD-10-PCS | Mod: S$GLB,,, | Performed by: EMERGENCY MEDICINE

## 2023-08-18 PROCEDURE — 99214 OFFICE O/P EST MOD 30 MIN: CPT | Mod: S$GLB,,, | Performed by: EMERGENCY MEDICINE

## 2023-08-18 NOTE — PROGRESS NOTES
Subjective:      Patient ID: Yareli Melendez is a 52 y.o. female.    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  At the time of initial visit x-ray was not available and it is available today however there is no acute bony pain or suspicion for fracture.  Her tenderness is muscular and left trapezius and left deltoid and there is no signs of swelling or bony point tenderness.  She has normal range of motion of the cervical spine.  Interestingly she already has a appointment with [facility]oped[facility] who is she is very comfortable with and has had prior work done on bunion surgery there.  She also was contacted by worker's compensation to be seen by Orthopedics, who authorized, scheduled her appointment for Monday at 3:00 p.m..  Explained to her that that was very uncommon however would follow through with those wishes to see specialist as it has already been recommended by her insurance company and already has it scheduled.  She knows that she may return p.r.n. to occupational health for any needs.  I did let her know that we had x-rays here today however she declined until her orthopedics appointment on Monday if deemed necessary.    Arm Injury   The incident occurred 6 to 12 hours ago. The incident occurred at work. The injury mechanism was a fall. The pain is present in the left elbow, left forearm, left hand, right hand, right wrist and left wrist. The quality  of the pain is described as aching and stabbing. The pain radiates to the left neck and back. The pain is at a severity of 4/10. The pain is moderate. The pain has been Intermittent since the incident. Associated symptoms include tingling. Pertinent negatives include no chest pain, muscle weakness or numbness. The symptoms are aggravated by movement.     ros    Cardiovascular:  Negative for chest pain.   Skin:  Negative for erythema.   Neurological:  Negative for numbness.   Objective:     Physical Exam  Vitals and nursing note reviewed.   Constitutional:       General: She is not in acute distress.     Appearance: Normal appearance. She is well-developed. She is not diaphoretic.   HENT:      Head: Normocephalic and atraumatic.      Nose: Nose normal.   Eyes:      General: Lids are normal.      Conjunctiva/sclera: Conjunctivae normal.   Neck:      Trachea: Trachea and phonation normal.   Cardiovascular:      Rate and Rhythm: Normal rate and regular rhythm.      Pulses: Normal pulses.      Heart sounds: Normal heart sounds.   Pulmonary:      Effort: Pulmonary effort is normal.      Breath sounds: Normal breath sounds.   Abdominal:      General: Bowel sounds are normal. There is no abdominal bruit.      Palpations: Abdomen is soft. There is no mass or pulsatile mass.   Musculoskeletal:         General: Signs of injury present. No swelling or deformity.      Cervical back: Full passive range of motion without pain, normal range of motion and neck supple.      Comments: Examination of bilateral lower extremity shows normal range of motion of bilateral knees without any ecchymosis or swelling.  She reports to the patellar region where the area of impact was however no signs of trauma and full range of motion and full strength on bilateral knee extension.  Anterior-posterior drawer signs negative and no pain on varus or valgus stress.      Examination bilateral hands and wrist shows no swelling no ecchymosis and normal  range of motion of the fingers wrist and elbow.  Bilateral shoulder exam normal.  Examination of the spine shows no cervical, thoracic, lumbar tenderness to palpation and no midline pain certainly.  There is mild right-sided thoracic paraspinous muscle stiffness not described as pain when she twists and flexes past 90°.   Skin:     General: Skin is warm and dry.      Findings: No bruising or erythema.   Neurological:      Mental Status: She is alert and oriented to person, place, and time.      Sensory: No sensory deficit.      Deep Tendon Reflexes: Reflexes are normal and symmetric.   Psychiatric:         Speech: Speech normal.         Behavior: Behavior normal. Behavior is cooperative.         Thought Content: Thought content normal.         Judgment: Judgment normal.      Assessment:      1. Encounter related to worker's compensation claim      Plan:                 No follow-ups on file.

## 2023-08-18 NOTE — LETTER
[facility] - Urgent Care  [address] [facility], [address]  [place] [address]  Phone: [phone]  Fax: [phone]  [facility] [facility]: [id number]    Pt Name: Yareli Melendez  Injury Date: 08/16/2023   Employee ID: [id number] Date of Treatment: 08/18/2023   Company: [employer] employee      Appointment Time: 09:00 AM Arrived: 08:56 AM   Provider: Mike Crawford MD Time Out:09:48 AM     Office Treatment:   1. Encounter related to worker's compensation claim    2. Contusion of left knee, subsequent encounter    3. Fall, subsequent encounter    4. Thoracic myofascial strain, subsequent encounter    5. Contusion of right wrist, subsequent encounter    6. Contusion of left wrist, subsequent encounter          Patient Instructions: Attention not to aggravate affected area, Daily home exercises/warm soaks, Apply ice 24-48 hours then apply heat/warm soaks    Restrictions: Discharged to Ortho/Neuro/Opthomologist/Surgeon, Home today (Rest ice anti-inflammatory, further restrictions as per orthopedics appointment on Monday at 3:00 p.m. as scheduled at [facility].)     Return Appointment: if symptoms worsen or fail to improve

## 2024-08-16 ENCOUNTER — PATIENT MESSAGE (OUTPATIENT)
Dept: OTOLARYNGOLOGY | Facility: CLINIC | Age: 54
End: 2024-08-16
Payer: COMMERCIAL

## 2024-09-17 NOTE — LETTER
December 4, 2020      [facility] - OB/ GYN  [address]  [place] [address]  Phone: [phone]       Patient: Yareli Melendez   YOB: 1970  Date of Visit: 12/04/2020    To Whom It May Concern:    Cesar Melendez  was at [facility] on 12/04/2020. Yareli Melendez  may return to work/school on December 05, 2020 no restrictions. If you have any questions or concerns, or if I can be of further assistance, please do not hesitate to contact me.    Sincerely,    Chen Brannon MA     
17-Sep-2024 20:37

## 2024-10-07 ENCOUNTER — CLINICAL SUPPORT (OUTPATIENT)
Dept: SPEECH THERAPY | Facility: HOSPITAL | Age: 54
End: 2024-10-07
Attending: OTOLARYNGOLOGY
Payer: COMMERCIAL

## 2024-10-07 ENCOUNTER — OFFICE VISIT (OUTPATIENT)
Dept: OTOLARYNGOLOGY | Facility: CLINIC | Age: 54
End: 2024-10-07
Payer: COMMERCIAL

## 2024-10-07 VITALS — DIASTOLIC BLOOD PRESSURE: 80 MMHG | HEART RATE: 120 BPM | SYSTOLIC BLOOD PRESSURE: 138 MMHG

## 2024-10-07 DIAGNOSIS — J38.5 LARYNGEAL SPASM: ICD-10-CM

## 2024-10-07 DIAGNOSIS — R49.0 DYSPHONIA: Primary | ICD-10-CM

## 2024-10-07 DIAGNOSIS — J38.3 VOCAL FOLD ATROPHY: ICD-10-CM

## 2024-10-07 DIAGNOSIS — R49.0 DYSPHONIA: ICD-10-CM

## 2024-10-07 PROCEDURE — 99214 OFFICE O/P EST MOD 30 MIN: CPT | Mod: 25,S$GLB,, | Performed by: OTOLARYNGOLOGY

## 2024-10-07 PROCEDURE — 99999 PR PBB SHADOW E&M-EST. PATIENT-LVL III: CPT | Mod: PBBFAC,,, | Performed by: OTOLARYNGOLOGY

## 2024-10-07 PROCEDURE — 1160F RVW MEDS BY RX/DR IN RCRD: CPT | Mod: CPTII,S$GLB,, | Performed by: OTOLARYNGOLOGY

## 2024-10-07 PROCEDURE — 1159F MED LIST DOCD IN RCRD: CPT | Mod: CPTII,S$GLB,, | Performed by: OTOLARYNGOLOGY

## 2024-10-07 PROCEDURE — 92524 BEHAVRAL QUALIT ANALYS VOICE: CPT | Mod: GN | Performed by: SPEECH-LANGUAGE PATHOLOGIST

## 2024-10-07 PROCEDURE — 3075F SYST BP GE 130 - 139MM HG: CPT | Mod: CPTII,S$GLB,, | Performed by: OTOLARYNGOLOGY

## 2024-10-07 PROCEDURE — 3079F DIAST BP 80-89 MM HG: CPT | Mod: CPTII,S$GLB,, | Performed by: OTOLARYNGOLOGY

## 2024-10-07 PROCEDURE — 31579 LARYNGOSCOPY TELESCOPIC: CPT | Mod: S$GLB,,, | Performed by: OTOLARYNGOLOGY

## 2024-10-07 NOTE — PROGRESS NOTES
Referring provider: Dr. Alin Solomon  Reason for visit:  Behavioral and qualitative analysis of voice and resonance (CPT 48607)    Subjective / History    Miss Yareli Melendez is a 53 y.o. female referred for voice evaluation (CPT 23020) by Dr. Solomon.  She presents with complaints of hoarseness, tight voice which began about 2 months ago, but has happened several times before.  She reports her voice is very tight and quiet, and often using it causes her to cough.  She reports this has happened before but never for weeks at a time.  She was evaluated by Dr. Cyr for this problem in August, but after a trip to urgent care her symptoms did resolved for a few days.  She reports the voice issue returned last week prior to her scheduled appt today with Dr. Solomon.     The patient also reported the following complaints:  voice worse in afternoon/evening, fatigue with use, changes in modal pitch, tightness, and reduced volume.   Patient works outside the home at the [employer].  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     Swallowing: no c/o   Breathing: coughing    Smoking: remote - 25 yrs ago    Stroboscopy findings (per Dr. Solomon on 10/7/24)  - mild bilateral vocal fold bowing  - complete closure, pliability intact  - variable mild supraglottic hyperfunction     Past Medical History:   Diagnosis Date    AR (allergic rhinitis)     Depression with anxiety     History of kidney stones     Ovarian cyst     bilateral    PONV (postoperative nausea and vomiting)      Current Outpatient Medications on File Prior to Visit   Medication Sig Dispense Refill    diclofenac (VOLTAREN) 75 MG EC tablet TK 1 T PO BID FOR UP TO 7 DAYS PRF PAIN (Patient not taking: Reported on 10/7/2024)      HYDROcodone-acetaminophen (NORCO) 5-325 mg per tablet Take 1 tablet by mouth every 6 (six) hours as needed for Pain. (Patient not taking: Reported on 10/7/2024) 20 tablet 0     ibuprofen (ADVIL,MOTRIN) 800 MG tablet Take 1 tablet (800 mg total) by mouth 3 (three) times daily. (Patient not taking: Reported on 10/7/2024) 60 tablet 0    promethazine (PHENERGAN) 25 MG tablet Take 1 tablet (25 mg total) by mouth every 6 (six) hours as needed for Nausea. 15 tablet 0     Current Facility-Administered Medications on File Prior to Visit   Medication Dose Route Frequency Provider Last Rate Last Admin    LIDOcaine (PF) 10 mg/ml (1%) injection 10 mg  1 mL Intradermal Once Kemal cAosta MD        mupirocin 2 % ointment   Nasal On Call Procedure Kemal Acosta MD   Given at 02/25/21 1155       Objective    Perceptual/behavioral assessment  -CAPE-V Overall Score: 45  -Quality: strained, cihld/pulse mode phonation, and breathy  -Volume: decreased projection  -Pitch: high F0  -Flexibility: diminished  -Habitual respiratory pattern: breath holding    Education / Stimulability Trials  Discussed importance of vocal hygiene including: hydration, conservation, and reducing throat clearing, coughing, other phonotraumatic behaviors.  Patient was stimulable for improved voice using SOVT exercises.  She was instructed on cup bubble phonation and was quickly stimulable to carry that improved voice into connected speech.  Encouraged practicing exercises several times daily in isolation and into short phrases to solidify muscle memory patterns and reduce extralaryngeal tension during speech tasks.  She was amenable to all suggestions.     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 Solomon.  Prognosis for continued improvement is  good, given timely progress in trial voice therapy during today's evaluation .     Recommendations / POC    -Recommend 1-3 sessions of voice therapy over 4-8 weeks with a speech-language pathologist to optimize glottal postures for improved vocal function, vocal efficiency, and ease of phonation  -Continue exercises as discussed in  session  -Contact clinician with any further questions     Functional goals  Length Status Goal   Long term Initiated  Patient will implement and adhere to vocal hygiene protocols on a daily basis, including the elimination of phonotraumatic behaviors.    Long term Initiated  Patient and clinician will facilitate changes in vocal function in order to restore functional use of voice for daily occupational, social, and emotional demands.    Long term Initiated  Patient will re-establish phonation with adequate balance of airflow and resonance with decreased muscle tension.    Long term Initiated   Patient will improve coordination of respiration and phonation for efficient vocal production at a conversational level.    Short term Initiated  Patient will complete SOVT exercises and/or resonant-focused exercises 3-5x daily to strengthen and balance the intrinsic laryngeal musculature and maximize glottic closure without medial hyperfunction.   Short term Initiated  Patient will improve the quality, efficiency, and ease of phonation through resonant and/or airflow exercises from the syllable to conversation level with 80% accuracy.   Short term Initiated  Patient will discriminate between easy and strained phonation with 80% accuracy.    Short term Initiated   Patient will identify the sensations associated with muscle relaxation in the abdominal, thoracic, neck and facial areas during efficient phonation with minimal clinician cue.

## 2024-10-07 NOTE — PLAN OF CARE
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 Solomon.  Prognosis for continued improvement is good, given timely progress in trial voice therapy during today's evaluation.     Recommendations / POC    -Recommend 1-3 sessions of voice therapy over 4-8 weeks with a speech-language pathologist to optimize glottal postures for improved vocal function, vocal efficiency, and ease of phonation  -Continue exercises as discussed in session  -Contact clinician with any further questions     Functional goals  Length Status Goal   Long term Initiated  Patient will implement and adhere to vocal hygiene protocols on a daily basis, including the elimination of phonotraumatic behaviors.    Long term Initiated  Patient and clinician will facilitate changes in vocal function in order to restore functional use of voice for daily occupational, social, and emotional demands.    Long term Initiated  Patient will re-establish phonation with adequate balance of airflow and resonance with decreased muscle tension.    Long term Initiated   Patient will improve coordination of respiration and phonation for efficient vocal production at a conversational level.    Short term Initiated  Patient will complete SOVT exercises and/or resonant-focused exercises 3-5x daily to strengthen and balance the intrinsic laryngeal musculature and maximize glottic closure without medial hyperfunction.   Short term Initiated  Patient will improve the quality, efficiency, and ease of phonation through resonant and/or airflow exercises from the syllable to conversation level with 80% accuracy.   Short term Initiated  Patient will discriminate between easy and strained phonation with 80% accuracy.    Short term Initiated   Patient will identify the sensations associated with muscle relaxation in the abdominal, thoracic, neck and facial areas during efficient phonation with minimal clinician cue.

## 2024-10-07 NOTE — PROGRESS NOTES
[facility]  Department of Otorhinolaryngology and Communication Sciences    Yareli Melendez is a 53 y.o. female who [facility] for [facility] at the kind request of Dr. He Cyr for further management of hoarseness.     She complains of hoarseness, voice loss. Onset was sudden. Duration is since August, without any clear inciting event. Time course is intermittent. Symptoms are worsening. Exacerbating factors include voice use, coughing. Alleviating factors include voice rest. Associated symptoms include coughing.     This problem has happened to her several times in the past but only for 3-5 days at a time, but in August she sounded bad for a few weeks.  Her voice had improved following her visit with Dr. Cyr in August but in the last 2-3 days has become rough/hoarse again.      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      Past Medical History  She has a past medical history of AR (allergic rhinitis), Depression with anxiety, History of kidney stones, Ovarian cyst, and PONV (postoperative nausea and vomiting).    Past Surgical History  She has a past surgical history that includes LASIK; Hysteroscopy; Dilation and curettage of uterus; ablation uterus; Robot-assisted laparoscopic abdominal hysterectomy using da Jesus Xi (N/A, 2/25/2021); and Robot-assisted laparoscopic salpingo-oophorectomy using da Jesus Xi (Bilateral, 2/25/2021).    Family History  Her family history is not on file.    Social History  She reports that she has quit smoking. She has never used smokeless tobacco. She reports current alcohol use of about 0.8 standard drinks of alcohol per week. She reports that she does not use drugs.    Allergies  She is allergic to erythromycin base, erythromycin, fluticasone, and sucralose.    Medications  She has a current medication list which includes the following prescription(s):  promethazine, diclofenac, hydrocodone-acetaminophen, and ibuprofen, and the following Facility-Administered Medications: lidocaine (pf) 10 mg/ml (1%) and mupirocin.    Review of Systems   Constitutional:  Negative for fever.   HENT:  Positive for postnasal drip and voice change. Negative for sore throat.    Eyes: Negative.  Negative for visual disturbance.   Respiratory:  Positive for cough. Negative for wheezing.    Cardiovascular: Negative.  Negative for chest pain.   Gastrointestinal:  Negative for nausea.   Endocrine: Negative.    Genitourinary: Negative.    Musculoskeletal:  Positive for neck pain. Negative for arthralgias.   Skin: Negative.  Negative for rash.   Allergic/Immunologic: Negative.    Neurological:  Positive for headaches. Negative for tremors.   Hematological: Negative.  Does not bruise/bleed easily.   Psychiatric/Behavioral: Negative.  The patient is not nervous/anxious.           Objective:     VS reviewed  Physical Exam  Constitutional: comfortable, well dressed  Psychiatric: appropriate affect  Respiratory: comfortably breathing, symmetric chest rise, no stridor  Voice:  Variable roughness/strain, intermittent aphonic breathiness; stimulable to improved clarity and resonance, essentially back to normal, with voice therapy prompting  Cardiovascular: upper extremities non-edematous  Lymphatic: no cervical lymphadenopathy  Neurologic: alert and oriented to time, place, person, and situation; cranial nerves 3-12 grossly intact  Head: normocephalic  Eyes: conjunctivae and sclerae clear  Ears: normal pinnae, normal external auditory canals, tympanic membranes intact  Nose: mucosa pink and noncongested, no masses, no mucopurulence, no polyps  Oral cavity / oropharynx: no mucosal lesions  Neck: soft, full range of motion, laryngotracheal complex palpable with appropriate landmarks, larynx elevates on swallowing  Indirect laryngoscopy: limited due to gag    Procedure  Flexible Laryngeal  Videostroboscopy (14357): Laryngeal videostroboscopy is indicated to assess laryngeal vibratory biomechanics and vocal fold oscillation, which cannot be assessed with a plain light examination. This was carried out transnasally with a distal chip videoendoscope. After verbal consent was obtained, the patient was positioned and the nose was topically decongested with 1% phenylephrine and topically anesthetized with 4% lidocaine. The endoscope was passed through the most patent nasal cavity and positioned to image the nasopharynx, larynx, and hypopharynx in detail. The following features were examined: nasopharyngeal, laryngeal, hypopharyngeal masses; velopharyngeal strength, closure, and symmetry of motion; vocal fold range and symmetry of motion; laryngeal mucosal edema, erythema, inflammation, and hydration; salivary pooling; and gross laryngeal sensation. During phonation, the vocal folds were assessed for glottal closure; mucosal wave; vocal fold lesions; vibratory periodicity, amplitude, and phase symmetry; and vertical height match. The equipment was removed. The patient tolerated the procedure well without complication. All findings were normal except:  - mild bilateral vocal fold bowing  - complete closure, pliability intact  - variable mild supraglottic hyperfunction      Assessment:     Yareli Melendez is a 53 y.o. female with chronic intermittent dysphonia due to laryngeal spasms of muscle tension dysphonia.  I also make note of mild bilateral vocal fold bowing.       Plan:        I had a discussion with the patient regarding her condition and the further workup and management options.      I demonstrated her examination to her on the video monitor.  Reassurance was provided.  SLP voice evaluation and voice therapy are medically necessary for restoration of laryngeal function.  She had an initial session in tandem with today's visit.  She left our office with a normal voice.  She will continue to work  with our team in the coming weeks.    She will follow up with me  as needed .    All questions were answered, and the patient is in agreement with the above.     Alin Solomon M.D.  [facility]  Department of Otorhinolaryngology and Communication Sciences

## 2024-11-04 ENCOUNTER — TELEPHONE (OUTPATIENT)
Dept: SPEECH THERAPY | Facility: HOSPITAL | Age: 54
End: 2024-11-04
Payer: COMMERCIAL

## 2025-04-08 ENCOUNTER — PATIENT MESSAGE (OUTPATIENT)
Dept: OTOLARYNGOLOGY | Facility: CLINIC | Age: 55
End: 2025-04-08
Payer: COMMERCIAL

## (undated) DEVICE — INSERT CUSHIONPRONE VIEW LARGE

## (undated) DEVICE — PAD PERI POST REPLACEMNT

## (undated) DEVICE — SEE MEDLINE ITEM 157110

## (undated) DEVICE — MANIPULATOR VCARE PLUS 34MM

## (undated) DEVICE — PORT ACCESS 8MM W/120MM LOW

## (undated) DEVICE — JELLY SURGILUBE 5GR

## (undated) DEVICE — CLOSURE SKIN STERI STRIP 1/2X4

## (undated) DEVICE — COVER TIP CURVED SCISSORS XI

## (undated) DEVICE — SOL WATER STRL IRR 1000ML

## (undated) DEVICE — GLOVE BIOGEL SKINSENSE PI 7.0

## (undated) DEVICE — SUT PDS II O CT-2 VIL MONO

## (undated) DEVICE — DEVICE ANC SW STAT FOLEY 6-24

## (undated) DEVICE — NDL INSUF ULTRA VERESS 120MM

## (undated) DEVICE — SUT MCRYL PLUS 4-0 PS2 27IN

## (undated) DEVICE — DRAPE ARM DAVINCI XI

## (undated) DEVICE — SOL ELECTROLUBE ANTI-STIC

## (undated) DEVICE — SEE MEDLINE ITEM 152622

## (undated) DEVICE — SEAL UNIVERSAL 5MM-8MM XI

## (undated) DEVICE — SYR 10CC LUER LOCK

## (undated) DEVICE — ELECTRODE REM PLYHSV RETURN 9

## (undated) DEVICE — DRAPE COLUMN DAVINCI XI

## (undated) DEVICE — SEE MEDLINE ITEM 156923

## (undated) DEVICE — GLOVE PROTEXIS NEOPRENE 7.5

## (undated) DEVICE — UNDERGLOVES BIOGEL PI SZ 7 LF

## (undated) DEVICE — SET TRI-LUMEN FILTERED TUBE

## (undated) DEVICE — SOL CLEARIFY VISUALIZATION LAP

## (undated) DEVICE — SOL NS 1000CC

## (undated) DEVICE — OBTURATOR BLADELESS 8MM XI CLR

## (undated) DEVICE — KIT WING PAD POSITIONING

## (undated) DEVICE — IRRIGATOR ENDOSCOPY DISP.